# Patient Record
Sex: FEMALE | Race: WHITE | NOT HISPANIC OR LATINO | Employment: OTHER | URBAN - METROPOLITAN AREA
[De-identification: names, ages, dates, MRNs, and addresses within clinical notes are randomized per-mention and may not be internally consistent; named-entity substitution may affect disease eponyms.]

---

## 2022-04-25 ENCOUNTER — TRANSCRIBE ORDERS (OUTPATIENT)
Dept: ADMINISTRATIVE | Facility: HOSPITAL | Age: 57
End: 2022-04-25

## 2022-04-25 DIAGNOSIS — R20.8 BURNING SENSATION: Primary | ICD-10-CM

## 2022-04-28 ENCOUNTER — TELEPHONE (OUTPATIENT)
Dept: ORTHOPEDIC SURGERY | Facility: CLINIC | Age: 57
End: 2022-04-28

## 2022-04-28 NOTE — TELEPHONE ENCOUNTER
Left message for patient to call back. Needs to be [facility] new patient appt with david or dr nobles for left hand pain. Referral scanned into media

## 2022-08-10 ENCOUNTER — APPOINTMENT (OUTPATIENT)
Dept: INFUSION THERAPY | Facility: HOSPITAL | Age: 57
End: 2022-08-10

## 2022-09-12 ENCOUNTER — OFFICE VISIT (OUTPATIENT)
Dept: SURGERY | Facility: CLINIC | Age: 57
End: 2022-09-12

## 2022-09-12 VITALS
HEART RATE: 72 BPM | HEIGHT: 60 IN | SYSTOLIC BLOOD PRESSURE: 118 MMHG | WEIGHT: 99 LBS | DIASTOLIC BLOOD PRESSURE: 74 MMHG | BODY MASS INDEX: 19.44 KG/M2 | RESPIRATION RATE: 16 BRPM

## 2022-09-12 DIAGNOSIS — Z72.0 TOBACCO USE: ICD-10-CM

## 2022-09-12 DIAGNOSIS — K80.20 GALLSTONES: Primary | ICD-10-CM

## 2022-09-12 PROCEDURE — 99204 OFFICE O/P NEW MOD 45 MIN: CPT | Performed by: SURGERY

## 2022-09-12 NOTE — H&P
Anya Lei 56 y.o. female presents @ the req of ANTHONY Higgins for eval of gallstones.  Pt c/o N/V, abd pain, and 45 lb weight loss.    Chief Complaint   Patient presents with   • Cholelithiasis             HPI   This very interesting 56-year-old female is a bit of a challenging historian.  Apparently she has a right upper quadrant pain and nausea vomiting a 45 pound weight loss.  She had a positive Cologuard test and apparently had a colonoscopy at [facility] which she said was not done properly.  She said she did her colon prep and she went for the procedure but was told the pictures were not good even though she saw good pictures.  She did have a CT scan that showed gallstones.  She is willing to have her gallbladder removed but is unsure if she can make it to [place].  She refuses to go back to [facility].  She smokes cigarettes.  She has no other complaints.      Review of Systems   All other systems reviewed and are negative.            Current Outpatient Medications:   •  AMLODIPINE BENZOATE PO, Take  by mouth., Disp: , Rfl:   •  ATORVASTATIN CALCIUM PO, Take  by mouth., Disp: , Rfl:   •  Empagliflozin (JARDIANCE PO), Take  by mouth., Disp: , Rfl:   •  LISINOPRIL PO, Take  by mouth., Disp: , Rfl:   •  MAGNESIUM CARBONATE PO, Take  by mouth., Disp: , Rfl:   •  METFORMIN HCL ER PO, Take  by mouth., Disp: , Rfl:         No Known Allergies        Past Medical History:   Diagnosis Date   • Arthritis    • Diabetes (HCC)    • GERD (gastroesophageal reflux disease)    • HTN (hypertension)            Past Surgical History:   Procedure Laterality Date   • COLONOSCOPY     • TENDON REPAIR             Social History     Tobacco Use   • Smoking status: Current Every Day Smoker   • Smokeless tobacco: Never Used   Substance Use Topics   • Alcohol use: Not Currently           Immunization History   Administered Date(s) Administered   • COVID-19 (PFIZER) PURPLE CAP 03/25/2021, 04/17/2021  "          Physical Exam  Vitals and nursing note reviewed.   Constitutional:       Appearance: Normal appearance.   HENT:      Head: Normocephalic and atraumatic.   Cardiovascular:      Rate and Rhythm: Normal rate and regular rhythm.   Pulmonary:      Effort: Pulmonary effort is normal.      Breath sounds: Normal breath sounds.   Abdominal:      General: Bowel sounds are normal.      Palpations: Abdomen is soft.   Musculoskeletal:         General: No swelling or tenderness.   Skin:     General: Skin is warm and dry.   Neurological:      General: No focal deficit present.      Mental Status: She is alert and oriented to person, place, and time.   Psychiatric:         Mood and Affect: Mood normal.         Behavior: Behavior normal.         Debilities/Disabilities Identified: None    Emotional Behavior: Appropriate      /74   Pulse 72   Resp 16   Ht 152.4 cm (60\")   Wt 44.9 kg (99 lb)   BMI 19.33 kg/m²         Diagnoses and all orders for this visit:    1. Gallstones (Primary)    I discussed with the patient the benefits and risks of performing a laparoscopic cholecystectomy with intraoperative cholangiogram possible open procedure.  Benefits and risks not limited to but including: Bleeding, infection, hernia formation, having to convert to an open procedure, injury to intra-abdominal structures, intra-abdominal abscess, intra-abdominal biloma, bile leak, DVT, PE, atelectasis, pneumonia, anesthetic complications.  The patient appeared to understand and is willing to proceed.    Thank you for allowing me to participate in the care of this interesting patient.            "

## 2022-09-12 NOTE — PROGRESS NOTES
Anya Lei 56 y.o. female presents @ the req of ANTHONY Higgins for eval of gallstones.  Pt c/o N/V, abd pain, and 45 lb weight loss.    Chief Complaint   Patient presents with   • Cholelithiasis             HPI   This very interesting 56-year-old female is a bit of a challenging historian.  Apparently she has a right upper quadrant pain and nausea vomiting a 45 pound weight loss.  She had a positive Cologuard test and apparently had a colonoscopy at [facility] which she said was not done properly.  She said she did her colon prep and she went for the procedure but was told the pictures were not good even though she saw good pictures.  She did have a CT scan that showed gallstones.  She is willing to have her gallbladder removed but is unsure if she can make it to [place].  She refuses to go back to [facility].  She smokes cigarettes.  She has no other complaints.      Review of Systems   All other systems reviewed and are negative.            Current Outpatient Medications:   •  AMLODIPINE BENZOATE PO, Take  by mouth., Disp: , Rfl:   •  ATORVASTATIN CALCIUM PO, Take  by mouth., Disp: , Rfl:   •  Empagliflozin (JARDIANCE PO), Take  by mouth., Disp: , Rfl:   •  LISINOPRIL PO, Take  by mouth., Disp: , Rfl:   •  MAGNESIUM CARBONATE PO, Take  by mouth., Disp: , Rfl:   •  METFORMIN HCL ER PO, Take  by mouth., Disp: , Rfl:         No Known Allergies        Past Medical History:   Diagnosis Date   • Arthritis    • Diabetes (HCC)    • GERD (gastroesophageal reflux disease)    • HTN (hypertension)            Past Surgical History:   Procedure Laterality Date   • COLONOSCOPY     • TENDON REPAIR             Social History     Tobacco Use   • Smoking status: Current Every Day Smoker   • Smokeless tobacco: Never Used   Substance Use Topics   • Alcohol use: Not Currently           Immunization History   Administered Date(s) Administered   • COVID-19 (PFIZER) PURPLE CAP 03/25/2021, 04/17/2021  "          Physical Exam  Vitals and nursing note reviewed.   Constitutional:       Appearance: Normal appearance.   HENT:      Head: Normocephalic and atraumatic.   Cardiovascular:      Rate and Rhythm: Normal rate and regular rhythm.   Pulmonary:      Effort: Pulmonary effort is normal.      Breath sounds: Normal breath sounds.   Abdominal:      General: Bowel sounds are normal.      Palpations: Abdomen is soft.   Musculoskeletal:         General: No swelling or tenderness.   Skin:     General: Skin is warm and dry.   Neurological:      General: No focal deficit present.      Mental Status: She is alert and oriented to person, place, and time.   Psychiatric:         Mood and Affect: Mood normal.         Behavior: Behavior normal.         Debilities/Disabilities Identified: None    Emotional Behavior: Appropriate      /74   Pulse 72   Resp 16   Ht 152.4 cm (60\")   Wt 44.9 kg (99 lb)   BMI 19.33 kg/m²         Diagnoses and all orders for this visit:    1. Gallstones (Primary)    I discussed with the patient the benefits and risks of performing a laparoscopic cholecystectomy with intraoperative cholangiogram possible open procedure.  Benefits and risks not limited to but including: Bleeding, infection, hernia formation, having to convert to an open procedure, injury to intra-abdominal structures, intra-abdominal abscess, intra-abdominal biloma, bile leak, DVT, PE, atelectasis, pneumonia, anesthetic complications.  The patient appeared to understand and is willing to proceed.    Thank you for allowing me to participate in the care of this interesting patient.        "

## 2022-09-12 NOTE — PATIENT INSTRUCTIONS
IF YOU SMOKE OR USE TOBACCO PLEASE READ THE FOLLOWING:  Why is smoking bad for me?  Smoking increases the risk of heart disease, lung disease, vascular disease, stroke, and cancer. If you smoke, STOP!    For more information:  Quit Now [place]  1-800-QUIT-NOW  https://rinay.quitlogix.org/en-US/    Steps to Quit Smoking  Smoking tobacco is the leading cause of preventable death. It can affect almost every organ in the body. Smoking puts you and those around you at risk for developing many serious chronic diseases. Quitting smoking can be difficult, but it is one of the best things that you can do for your health. It is never too late to quit.  How do I get ready to quit?  When you decide to quit smoking, create a plan to help you succeed. Before you quit:  · Pick a date to quit. Set a date within the next 2 weeks to give you time to prepare.  · Write down the reasons why you are quitting. Keep this list in places where you will see it often.  · Tell your family, friends, and co-workers that you are quitting. Support from your loved ones can make quitting easier.  · Talk with your health care provider about your options for quitting smoking.  · Find out what treatment options are covered by your health insurance.  · Identify people, places, things, and activities that make you want to smoke (triggers). Avoid them.  What first steps can I take to quit smoking?  · Throw away all cigarettes at home, at work, and in your car.  · Throw away smoking accessories, such as ashtrays and lighters.  · Clean your car. Make sure to empty the ashtray.  · Clean your home, including curtains and carpets.  What strategies can I use to quit smoking?  Talk with your health care provider about combining strategies, such as taking medicines while you are also receiving in-person counseling. Using these two strategies together makes you more likely to succeed in quitting than if you used either strategy on its own.  · If you are  pregnant or breastfeeding, talk with your health care provider about finding counseling or other support strategies to quit smoking. Do not take medicine to help you quit smoking unless your health care provider tells you to do so.  To quit smoking:  Quit right away  · Quit smoking completely, instead of gradually reducing how much you smoke over a period of time. Research shows that stopping smoking right away is more successful than gradually quitting.  · Attend in-person counseling to help you build problem-solving skills. You are more likely to succeed in quitting if you attend counseling sessions regularly. Even short sessions of 10 minutes can be effective.  Take medicine  You may take medicines to help you quit smoking. Some medicines require a prescription and some you can purchase over-the-counter. Medicines may have nicotine in them to replace the nicotine in cigarettes. Medicines may:  · Help to stop cravings.  · Help to relieve withdrawal symptoms.  Your health care provider may recommend:  · Nicotine patches, gum, or lozenges.  · Nicotine inhalers or sprays.  · Non-nicotine medicine that is taken by mouth.  Find resources  Find resources and support systems that can help you to quit smoking and remain smoke-free after you quit. These resources are most helpful when you use them often. They include:  · Online chats with a counselor.  · Telephone quitlines.  · Printed self-help materials.  · Support groups or group counseling.  · Text messaging programs.  · Mobile phone apps or applications. Use apps that can help you stick to your quit plan by providing reminders, tips, and encouragement. There are many free apps for mobile devices as well as websites. Examples include Quit Guide from the CDC and smokefree.gov  What things can I do to make it easier to quit?    · Reach out to your family and friends for support and encouragement. Call telephone quitlines (7-800-QUIT-NOW), reach out to support groups, or  work with a counselor for support.  · Ask people who smoke to avoid smoking around you.  · Avoid places that trigger you to smoke, such as bars, parties, or smoke-break areas at work.  · Spend time with people who do not smoke.  · Lessen the stress in your life. Stress can be a smoking trigger for some people. To lessen stress, try:  ? Exercising regularly.  ? Doing deep-breathing exercises.  ? Doing yoga.  ? Meditating.  ? Performing a body scan. This involves closing your eyes, scanning your body from head to toe, and noticing which parts of your body are particularly tense. Try to relax the muscles in those areas.  How will I feel when I quit smoking?  Day 1 to 3 weeks  Within the first 24 hours of quitting smoking, you may start to feel withdrawal symptoms. These symptoms are usually most noticeable 2-3 days after quitting, but they usually do not last for more than 2-3 weeks. You may experience these symptoms:  · Mood swings.  · Restlessness, anxiety, or irritability.  · Trouble concentrating.  · Dizziness.  · Strong cravings for sugary foods and nicotine.  · Mild weight gain.  · Constipation.  · Nausea.  · Coughing or a sore throat.  · Changes in how the medicines that you take for unrelated issues work in your body.  · Depression.  · Trouble sleeping (insomnia).  Week 3 and afterward  After the first 2-3 weeks of quitting, you may start to notice more positive results, such as:  · Improved sense of smell and taste.  · Decreased coughing and sore throat.  · Slower heart rate.  · Lower blood pressure.  · Clearer skin.  · The ability to breathe more easily.  · Fewer sick days.  Quitting smoking can be very challenging. Do not get discouraged if you are not successful the first time. Some people need to make many attempts to quit before they achieve long-term success. Do your best to stick to your quit plan, and talk with your health care provider if you have any questions or concerns.  Summary  · Smoking tobacco  is the leading cause of preventable death. Quitting smoking is one of the best things that you can do for your health.  · When you decide to quit smoking, create a plan to help you succeed.  · Quit smoking right away, not slowly over a period of time.  · When you start quitting, seek help from your health care provider, family, or friends.  This information is not intended to replace advice given to you by your health care provider. Make sure you discuss any questions you have with your health care provider.  Document Revised: 09/11/2020 Document Reviewed: 03/07/2020  Elsevier Patient Education © 2021 [employer]

## 2022-09-13 ENCOUNTER — TELEPHONE (OUTPATIENT)
Dept: SURGERY | Facility: CLINIC | Age: 57
End: 2022-09-13

## 2022-09-13 NOTE — TELEPHONE ENCOUNTER
Notified per Marcella in [facility] Sched that pt called and cx'd [facility] sched 09/29/2022 due to no transportation.  Letter to PCP.

## 2022-09-16 ENCOUNTER — PATIENT ROUNDING (BHMG ONLY) (OUTPATIENT)
Dept: SURGERY | Facility: CLINIC | Age: 57
End: 2022-09-16

## 2022-09-16 NOTE — PROGRESS NOTES
September 16, 2022    Hello, may I speak with Anya Lei?    My name is Gricelda Lester      I am  with [facility]  [address] [place] [address] [phone].    Before we get started may I verify your date of birth? 1965    I am calling to officially welcome you to our practice and ask about your recent visit. Is this a good time to talk? yes    Tell me about your visit with us. What things went well?  Visit was great. No complaints.       We're always looking for ways to make our patients' experiences even better. Do you have recommendations on ways we may improve?  no    Overall were you satisfied with your first visit to our practice? yes       I appreciate you taking the time to speak with me today. Is there anything else I can do for you? no      Thank you, and have a great day.

## 2022-11-17 ENCOUNTER — APPOINTMENT (OUTPATIENT)
Dept: INFUSION THERAPY | Facility: HOSPITAL | Age: 57
End: 2022-11-17